# Patient Record
Sex: FEMALE | URBAN - METROPOLITAN AREA
[De-identification: names, ages, dates, MRNs, and addresses within clinical notes are randomized per-mention and may not be internally consistent; named-entity substitution may affect disease eponyms.]

---

## 2024-07-02 ENCOUNTER — APPOINTMENT (OUTPATIENT)
Dept: PRIMARY CARE | Facility: CLINIC | Age: 29
End: 2024-07-02
Payer: COMMERCIAL

## 2024-08-05 ENCOUNTER — APPOINTMENT (OUTPATIENT)
Dept: PRIMARY CARE | Facility: CLINIC | Age: 29
End: 2024-08-05
Payer: COMMERCIAL

## 2024-08-05 VITALS
WEIGHT: 110.4 LBS | SYSTOLIC BLOOD PRESSURE: 106 MMHG | HEIGHT: 63 IN | DIASTOLIC BLOOD PRESSURE: 76 MMHG | BODY MASS INDEX: 19.56 KG/M2 | HEART RATE: 68 BPM

## 2024-08-05 DIAGNOSIS — D50.9 IRON DEFICIENCY ANEMIA, UNSPECIFIED IRON DEFICIENCY ANEMIA TYPE: Primary | ICD-10-CM

## 2024-08-05 DIAGNOSIS — Z00.00 ROUTINE GENERAL MEDICAL EXAMINATION AT A HEALTH CARE FACILITY: ICD-10-CM

## 2024-08-05 DIAGNOSIS — F41.9 ANXIETY: ICD-10-CM

## 2024-08-05 PROCEDURE — 99203 OFFICE O/P NEW LOW 30 MIN: CPT | Performed by: STUDENT IN AN ORGANIZED HEALTH CARE EDUCATION/TRAINING PROGRAM

## 2024-08-05 PROCEDURE — 1036F TOBACCO NON-USER: CPT | Performed by: STUDENT IN AN ORGANIZED HEALTH CARE EDUCATION/TRAINING PROGRAM

## 2024-08-05 PROCEDURE — 3008F BODY MASS INDEX DOCD: CPT | Performed by: STUDENT IN AN ORGANIZED HEALTH CARE EDUCATION/TRAINING PROGRAM

## 2024-08-05 ASSESSMENT — ENCOUNTER SYMPTOMS
FLANK PAIN: 0
DIARRHEA: 0
HEADACHES: 0
ABDOMINAL PAIN: 0
DIZZINESS: 0
NERVOUS/ANXIOUS: 0
ARTHRALGIAS: 0
FEVER: 0
PALPITATIONS: 0
SHORTNESS OF BREATH: 0
DYSURIA: 0
BLOOD IN STOOL: 0
RHINORRHEA: 0
CHILLS: 0
NAUSEA: 0
CONSTIPATION: 0
EYE REDNESS: 0
DYSPHORIC MOOD: 0
VOMITING: 0
SLEEP DISTURBANCE: 0
MYALGIAS: 0
COUGH: 0

## 2024-08-05 ASSESSMENT — PATIENT HEALTH QUESTIONNAIRE - PHQ9
SUM OF ALL RESPONSES TO PHQ9 QUESTIONS 1 AND 2: 0
1. LITTLE INTEREST OR PLEASURE IN DOING THINGS: NOT AT ALL
2. FEELING DOWN, DEPRESSED OR HOPELESS: NOT AT ALL

## 2024-08-05 NOTE — PROGRESS NOTES
Subjective   Patient ID: Teddy White is a 29 y.o. female who presents for establish care. Previous provider Dr Samantha Harrison in [place]. Been about 5 years.     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    Anemia - notes hx of SERENA when she was running cross crountry and track in college at [employer], states was tried on oral replacement but eventually transitioned to iron infusions, she is now taking an iron supplement as part of her daily routine, she is asymptomatic, denies CP, SOB, palpitations, HA, LH, abd pain, n/v/d, states that she has regular menses without heavy bleeding    Cervical Cancer Screening: no hx abnormal, due, she will consider  Breast Cancer Screening: no fhx, due for cbe, plan to start mammo at age 38  Osteoporosis Screening: plan to start at age 63  Colon Cancer Screening: no fhx, asymptomatic, plan to start at age 43  Tobacco: never  Alcohol: social  Recreational Drugs: denies  Immunizations: TDaP 2021, she is not sure about HPV and will check records, otherwise utd    Review of Systems   Constitutional:  Negative for chills and fever.   HENT:  Negative for congestion and rhinorrhea.    Eyes:  Negative for redness.   Respiratory:  Negative for cough and shortness of breath.    Cardiovascular:  Negative for chest pain, palpitations and leg swelling.   Gastrointestinal:  Negative for abdominal pain, blood in stool, constipation, diarrhea, nausea and vomiting.   Endocrine: Negative for cold intolerance and heat intolerance.   Genitourinary:  Negative for dysuria and flank pain.   Musculoskeletal:  Negative  "for arthralgias and myalgias.   Skin:  Negative for rash.   Neurological:  Negative for dizziness and headaches.   Psychiatric/Behavioral:  Negative for dysphoric mood and sleep disturbance. The patient is not nervous/anxious.      Objective   /76   Pulse 68   Ht 1.6 m (5' 3\")   Wt 50.1 kg (110 lb 6.4 oz)   BMI 19.56 kg/m²     Physical Exam  Vitals reviewed.   Constitutional:       General: She is not in acute distress.     Appearance: Normal appearance.   HENT:      Head: Normocephalic and atraumatic.   Eyes:      General: No scleral icterus.     Conjunctiva/sclera: Conjunctivae normal.   Cardiovascular:      Rate and Rhythm: Normal rate and regular rhythm.      Heart sounds: No murmur heard.  Pulmonary:      Effort: Pulmonary effort is normal. No respiratory distress.      Breath sounds: Normal breath sounds.   Abdominal:      General: Bowel sounds are normal. There is no distension.      Palpations: Abdomen is soft.      Tenderness: There is no abdominal tenderness. There is no guarding or rebound.   Musculoskeletal:         General: No swelling or deformity.      Cervical back: Normal range of motion and neck supple.   Skin:     General: Skin is warm and dry.      Findings: No rash.   Neurological:      General: No focal deficit present.      Mental Status: She is alert.   Psychiatric:         Mood and Affect: Mood normal.         Behavior: Behavior normal.       Assessment/Plan   Problem List Items Addressed This Visit             ICD-10-CM    Iron deficiency anemia - Primary D50.9     Tx with iron infusions in the past when running competitively in college. She is asymptomatic at this time. Does take daily otc iron supplement. Will update labs and will follow up with results.         Relevant Orders    CBC and Auto Differential    Iron and TIBC    Ferritin    Anxiety F41.9     Well-controlled at this time. Return precautions reviewed.           Other Visit Diagnoses         Codes    Routine general " medical examination at a health care facility     Z00.00    Relevant Orders    CBC and Auto Differential    Comprehensive Metabolic Panel    Lipid Panel    Iron and TIBC    Ferritin    Follow Up In Primary Care - Health Maintenance    Hepatitis C Antibody        Will obtain screening labs and will follow up with results. Follow up in 1yr for recheck, sooner if needed.

## 2024-08-05 NOTE — ASSESSMENT & PLAN NOTE
Tx with iron infusions in the past when running competitively in college. She is asymptomatic at this time. Does take daily otc iron supplement. Will update labs and will follow up with results.

## 2024-10-07 ENCOUNTER — OFFICE VISIT (OUTPATIENT)
Dept: URGENT CARE | Facility: CLINIC | Age: 29
End: 2024-10-07
Payer: COMMERCIAL

## 2024-10-07 VITALS
HEIGHT: 63 IN | DIASTOLIC BLOOD PRESSURE: 87 MMHG | WEIGHT: 110 LBS | BODY MASS INDEX: 19.49 KG/M2 | HEART RATE: 69 BPM | SYSTOLIC BLOOD PRESSURE: 121 MMHG | RESPIRATION RATE: 16 BRPM | TEMPERATURE: 98.3 F | OXYGEN SATURATION: 97 %

## 2024-10-07 DIAGNOSIS — H61.23 BILATERAL IMPACTED CERUMEN: Primary | ICD-10-CM

## 2024-10-07 PROCEDURE — 99212 OFFICE O/P EST SF 10 MIN: CPT | Performed by: PHYSICIAN ASSISTANT

## 2024-10-07 PROCEDURE — 69210 REMOVE IMPACTED EAR WAX UNI: CPT | Performed by: PHYSICIAN ASSISTANT

## 2024-10-07 NOTE — PROGRESS NOTES
[facility] URGENT CARE   SHIRA NOTE:      Name: Tevin Mckeon, 29 y.o.    CSN:[id number]   MRN:[id number]    PCP: Cori Kumari MD    ALL:  No Known Allergies    History:    Chief Complaint: Ear Fullness (Pt states fulness in left ear for 4 days. )    Encounter Date: 10/7/2024      HPI: The history was obtained from the patient. Tevin is a 29 y.o. female, who presents with a chief complaint of Ear Fullness (Pt states fulness in left ear for 4 days. )     PMHx:    No past medical history on file.           No current outpatient medications on file.     No current facility-administered medications for this visit.         PMSx:  No past surgical history on file.    Fam Hx: No family history on file.    SOC. Hx:     Social History     Socioeconomic History    Marital status: Single     Spouse name: Not on file    Number of children: Not on file    Years of education: Not on file    Highest education level: Not on file   Occupational History    Not on file   Tobacco Use    Smoking status: Not on file    Smokeless tobacco: Not on file   Substance and Sexual Activity    Alcohol use: Not on file    Drug use: Not on file    Sexual activity: Not on file   Other Topics Concern    Not on file   Social History Narrative    Not on file     Social Determinants of Health     Financial Resource Strain: Not on file   Food Insecurity: Not on file   Transportation Needs: Not on file   Physical Activity: Not on file   Stress: Not on file   Social Connections: Not on file   Intimate Partner Violence: Not on file   Housing Stability: Not on file         Vitals:    10/07/24 0917   BP: 121/87   Pulse: 69   Resp: 16   Temp: 36.8 °C (98.3 °F)   SpO2: 97%     49.9 kg (110 lb)          Physical Exam  Vitals reviewed.   Constitutional:       Appearance: Normal appearance.   HENT:      Right Ear: There is impacted cerumen.      Left Ear: There is impacted cerumen.      Mouth/Throat:      Mouth: Mucous membranes are moist.    Musculoskeletal:         General: Normal range of motion.      Cervical back: Normal range of motion.   Skin:     General: Skin is warm.      Capillary Refill: Capillary refill takes less than 2 seconds.   Neurological:      Mental Status: She is alert.   Psychiatric:         Mood and Affect: Mood normal.           Patient ID: Tevin Mckeon is a 29 y.o. female.    Procedures  Procedure: cerumen removal  Performed by: PONCHO Zambrano [facility]     Patient had a significant amount of cerumen in her bilateral ear canal(s). Using flushing and currettage, I carefully removed as much cerumen as I could.  There was no TM perforation, and no bleeding, and she tolerated the procedure without difficulty.    ____________________________________________________________________    I did personally review Tevin's past medical history, surgical history, social history, as well as family history (when relevant).  In this case, I also oversaw the her drug management by reviewing her medication list, allergy list, as well as the medications that I prescribed during the UC course and/or recommended as an out-patient (including possible OTC medications such as acetaminophen, NSAIDs , etc).    After reviewing the items above, I did not look at previous medical documentation, such as recent hospitalizations, office visits, and/or recent consultations with PCP/specialist.                          SDOH:   Another factor that I considered in Tevin's care was her Social Determinants of Health (SDOH). During this UC encounter, she did not have social determinants of health. Those SDOH influencing Tevin's care are: none      _____________________________________________________________________      UC COURSE/MEDICAL DECISION MAKING:    Tevin is a 29 y.o., who presents with a working diagnosis of   1. Bilateral impacted cerumen      Hearing restored, no perf, pt feels better.        Tutu Salas PA-C   Advanced Practice Provider  [facility]  [facility] URG[facility]ARE

## 2024-11-07 ENCOUNTER — APPOINTMENT (OUTPATIENT)
Dept: PRIMARY CARE | Facility: CLINIC | Age: 29
End: 2024-11-07
Payer: COMMERCIAL

## 2025-02-21 ENCOUNTER — OFFICE VISIT (OUTPATIENT)
Dept: URGENT CARE | Facility: CLINIC | Age: 30
End: 2025-02-21
Payer: COMMERCIAL

## 2025-02-21 VITALS
OXYGEN SATURATION: 100 % | SYSTOLIC BLOOD PRESSURE: 125 MMHG | HEART RATE: 72 BPM | TEMPERATURE: 97.7 F | DIASTOLIC BLOOD PRESSURE: 86 MMHG | RESPIRATION RATE: 16 BRPM

## 2025-02-21 DIAGNOSIS — J06.9 URI WITH COUGH AND CONGESTION: Primary | ICD-10-CM

## 2025-02-21 LAB
POC CORONAVIRUS 2019 BY PCR (COV19): NOT DETECTED
POC FLU A RESULT: NOT DETECTED
POC FLU B RESULT: NOT DETECTED
POC RSV PCR: NOT DETECTED

## 2025-02-21 PROCEDURE — 99212 OFFICE O/P EST SF 10 MIN: CPT | Performed by: PHYSICIAN ASSISTANT

## 2025-02-21 NOTE — PROGRESS NOTES
[facility] URGENT CARE   SHIRA NOTE:      Name: Tevin Mckeon, 30 y.o.    CSN:[id number]   MRN:[id number]    PCP: Cori Kumari MD    ALL:  No Known Allergies    History:    Chief Complaint: Cough, Sore Throat, Nasal Congestion, and Headache (X 3 days)    Encounter Date: 2/21/2025      HPI: The history was obtained from the patient. Tevin is a 30 y.o. female, who presents with a chief complaint of Cough, Sore Throat, Nasal Congestion, and Headache (X 3 days) plan to attend a family gathering tomorrow wanted to make sure she was not contagious or at least exhibiting any active signs of a respiratory illness such as COVID, flu or RSV.    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    PMHx:    No past medical history on file.           No current outpatient medications on file.     No current facility-administered medications for this visit.         PMSx:  No past surgical history on file.    Fam Hx: No family history on file.    SOC. Hx:     Social History     Socioeconomic History    Marital status: Single     Spouse name: Not on file    Number of children: Not on file    Years of education: Not on file    Highest education level: Not on file   Occupational History    Not on file   Tobacco Use    Smoking status: Not on file    Smokeless tobacco: Not on file   Substance and Sexual Activity    Alcohol use: Not on file    Drug use: Not on file    Sexual activity: Not on file   Other Topics Concern    Not on file   Social History Narrative    Not on file     Social Drivers of Health     Financial Resource Strain: Not on file   Food Insecurity: Not on file   Transportation Needs: Not on file   Physical Activity: Not on file   Stress: Not on file   Social Connections: Not on file   Intimate Partner Violence: Not on file   Housing Stability: Not on file         Vitals:    02/21/25 1531   BP: 125/86   Pulse: 72   Resp: 16   Temp: 36.5 °C (97.7 °F)   SpO2: 100%                Physical  Exam  Vitals reviewed.   Constitutional:       Appearance: Normal appearance.   HENT:      Head: Normocephalic and atraumatic.      Nose: Congestion (Mild) present. No mucosal edema.      Right Turbinates: Not enlarged or swollen.      Left Turbinates: Not enlarged or swollen.      Mouth/Throat:      Mouth: Mucous membranes are moist.      Pharynx: No oropharyngeal exudate.   Eyes:      Extraocular Movements: Extraocular movements intact.      Pupils: Pupils are equal, round, and reactive to light.   Cardiovascular:      Rate and Rhythm: Normal rate.      Pulses: Normal pulses.   Pulmonary:      Effort: Pulmonary effort is normal.      Breath sounds: Normal breath sounds. No decreased breath sounds, wheezing or rhonchi.   Musculoskeletal:         General: Normal range of motion.      Cervical back: Normal range of motion.   Skin:     General: Skin is warm.   Neurological:      Mental Status: She is alert and oriented to person, place, and time.           LABORATORY @ RADIOLOGICAL IMAGING (if done):     Results for orders placed or performed in visit on 02/21/25 (from the past 24 hours)   POCT SARS-COV-2/FLU/RSV PCR SYMPTOMATIC manually resulted   Result Value Ref Range    POC Coronavirus 2019, PCR Not Detected Not Detected    POC Flu A Result Not Detected Not Detected    POC Flu B Result Not Detected Not Detected    POC RSV PCR Not Detected Not Detected       ____________________________________________________________________    I did personally review Tevin's past medical history, surgical history, social history, as well as family history (when relevant).  In this case, I also oversaw the her drug management by reviewing her medication list, allergy list, as well as the medications that I prescribed during the UC course and/or recommended as an out-patient (including possible OTC medications such as acetaminophen, NSAIDs , etc).    After reviewing the items above, I did look at previous medical documentation,  such as recent hospitalizations, office visits, and/or recent consultations with PCP/specialist.                          SDOH:   Another factor that I considered in Tevin's care was her Social Determinants of Health (SDOH). During this  encounter, she did not have social determinants of health. Those SDOH influencing Tevin's care are: none      _____________________________________________________________________       COURSE/MEDICAL DECISION MAKING:    Tevin is a 30 y.o., who presents with a working diagnosis of   1. URI with cough and congestion     with a differential to include: Influenza, parainfluenza, rhinovirus, adenovirus, metapneumovirus, coronavirus, COVID-19, postnasal drip, strep pharyngitis, GERD, retropharyngeal abscess, tonsillitis, adenitis, seasonal allergies    1) URI with cough/congestion: supportive care recommended, discussed use of OTC analgesics APAP/NSAID for fever/pain control, discussed hydration & when to seek re-evaluation.        Tutu Salas PA-C   Advanced Practice Provider  [facility] URGENT CARE    Please note: While the patient may or may not have received printed discharge paperwork, all relevant medical findings, test results, and treatment details are accessible through the electronic medical record system. The patient is encouraged to review their chart via the patient portal for comprehensive information and follow-up instructions.

## 2025-02-26 DIAGNOSIS — J01.90 SUBACUTE RHINOSINUSITIS: Primary | ICD-10-CM

## 2025-02-26 RX ORDER — AZITHROMYCIN 250 MG/1
TABLET, FILM COATED ORAL
Qty: 6 TABLET | Refills: 0 | Status: SHIPPED | OUTPATIENT
Start: 2025-02-26 | End: 2025-03-03

## 2025-02-26 RX ORDER — DEXTROMETHORPHAN HYDROBROMIDE, GUAIFENESIN AND PSEUDOEPHEDRINE HYDROCHLORIDE 15; 400; 60 MG/1; MG/1; MG/1
1 TABLET ORAL 3 TIMES DAILY
Qty: 21 TABLET | Refills: 0 | Status: SHIPPED | OUTPATIENT
Start: 2025-02-26 | End: 2025-03-05

## 2025-02-26 NOTE — PROGRESS NOTES
[facility] URGENT CARE Telephone NOTE:    Name: Tevin Mckeon, 30 y.o.    CSN:[id number]   MRN:[id number]    PCP: Cori Kumari MD  ALL:  No Known Allergies      Date of call: 02/26/25  Time of Call: 4:36 PM    Connection was: 16:37    Spoke with: Patient      Purpose:  return their call    Details:   Requesting some additional treatment.  Will send additional option & to preferred pharmacy.

## 2025-02-27 DIAGNOSIS — J01.90 ACUTE RHINOSINUSITIS: Primary | ICD-10-CM

## 2025-02-27 RX ORDER — PREDNISONE 10 MG/1
30 TABLET ORAL DAILY
Qty: 21 TABLET | Refills: 0 | Status: SHIPPED | OUTPATIENT
Start: 2025-02-27 | End: 2025-03-06

## 2025-02-27 NOTE — PROGRESS NOTES
[facility] URGENT CARE Telephone NOTE:    Name: Tevin Mckeon, 30 y.o.    CSN:[id number]   MRN:[id number]    PCP: Cori Kumari MD  ALL:  No Known Allergies      Date of call: 02/27/25  Time of Call: 3:21 PM    Connection was: received    Spoke with: Patient      Purpose:  Requesting prednisone    Details:   Sent

## 2025-08-06 ENCOUNTER — APPOINTMENT (OUTPATIENT)
Dept: PRIMARY CARE | Facility: CLINIC | Age: 30
End: 2025-08-06
Payer: COMMERCIAL